# Patient Record
Sex: FEMALE | Race: WHITE | URBAN - METROPOLITAN AREA
[De-identification: names, ages, dates, MRNs, and addresses within clinical notes are randomized per-mention and may not be internally consistent; named-entity substitution may affect disease eponyms.]

---

## 2018-02-04 ENCOUNTER — OFFICE VISIT (OUTPATIENT)
Dept: URGENT CARE | Facility: URGENT CARE | Age: 38
End: 2018-02-04
Payer: COMMERCIAL

## 2018-02-04 VITALS
OXYGEN SATURATION: 100 % | SYSTOLIC BLOOD PRESSURE: 102 MMHG | WEIGHT: 117.2 LBS | TEMPERATURE: 97.3 F | DIASTOLIC BLOOD PRESSURE: 60 MMHG | HEART RATE: 76 BPM

## 2018-02-04 DIAGNOSIS — H57.12 LEFT EYE PAIN: Primary | ICD-10-CM

## 2018-02-04 PROCEDURE — 99213 OFFICE O/P EST LOW 20 MIN: CPT | Performed by: FAMILY MEDICINE

## 2018-02-04 RX ORDER — TOBRAMYCIN AND DEXAMETHASONE 3; 1 MG/ML; MG/ML
1 SUSPENSION/ DROPS OPHTHALMIC
Qty: 1 BOTTLE | Refills: 0 | Status: SHIPPED | OUTPATIENT
Start: 2018-02-04 | End: 2018-02-11

## 2018-02-04 NOTE — NURSING NOTE
Chief Complaint   Patient presents with     Conjunctivitis     Her left eye is red, light sensitive, and has drainage coming out of it.        Initial /60  Pulse 76  Temp 97.3  F (36.3  C) (Tympanic)  Wt 117 lb 3.2 oz (53.2 kg)  SpO2 100% There is no height or weight on file to calculate BMI.  Medication Reconciliation: complete     Lyndsey Solis MA

## 2018-02-04 NOTE — PROGRESS NOTES
Chief Complaint   Patient presents with     Conjunctivitis     Her left eye is red, light sensitive, and has drainage coming out of it.       ADDITIONAL HPI: 37 year old female here for above issue.     ROS: 10 point review of systems negative except as per HPI.    PAST MEDICAL HISTORY:  No past medical history on file.     ACTIVE MEDICAL PROBLEMS:  There is no problem list on file for this patient.       FAMILY HISTORY:  No family history on file.    SOCIAL HISTORY:  Social History     Social History     Marital status: Single     Spouse name: N/A     Number of children: N/A     Years of education: N/A     Occupational History     Not on file.     Social History Main Topics     Smoking status: Not on file     Smokeless tobacco: Not on file     Alcohol use Not on file     Drug use: Not on file     Sexual activity: Not on file     Other Topics Concern     Not on file     Social History Narrative       MEDICATIONS:  No current outpatient prescriptions on file.       ALLERGIES:   No Known Allergies    Problem list, Medication list, Allergies, and Medical/Social/Surgical histories reviewed in [facility] and updated as appropriate.    OBJECTIVE:                                                    VITALS: /60  Pulse 76  Temp 97.3  F (36.3  C) (Tympanic)  Wt 117 lb 3.2 oz (53.2 kg)  SpO2 100% There is no height or weight on file to calculate BMI.  GENERAL: Pleasant, well appearing female.  HEENT: PERRL, EOMI, left  conjunctival injection and wattering. Has mild left  eye pain with contralateral pupillary reflex.     ASSESSMENT/PLAN:                                                    1. Left eye pain  Suspect possible iritis. Advised follow-up with ophthalmology within 24 hrs. Will start tobradex eye drops.   - OPHTHALMOLOGY ADULT REFERRAL  - tobramycin-dexamethasone (TOBRADEX) 0.3-0.1 % ophthalmic susp; Place 1 drop Into the left eye every 4 hours (while awake) for 7 days  Dispense: 1 Bottle; Refill: 0

## 2018-02-04 NOTE — MR AVS SNAPSHOT
After Visit Summary   2/4/2018    Erin Rob    MRN: [id number]           Patient Information     Date Of Birth          1980        Visit Information        Provider Department      2/4/2018 9:10 AM Jose Arango MD [facility]        Today's Diagnoses     Left eye pain    -  1      Care Instructions    Possible iritis follow-up with ophthalmology.          Follow-ups after your visit        Additional Services     OPHTHALMOLOGY ADULT REFERRAL       Your provider has referred you to: [facility]: [facility] [phone]   [email or web address]  [facility]: [facility] [phone]   [email or web address]    Please be aware that coverage of these services is subject to the terms and limitations of your health insurance plan.  Call member services at your health plan with any benefit or coverage questions.      Please bring the following with you to your appointment:    (1) Any X-Rays, CTs or MRIs which have been performed.  Contact the facility where they were done to arrange for  prior to your scheduled appointment.    (2) List of current medications  (3) This referral request   (4) Any documents/labs given to you for this referral                  Who to contact     If you have questions or need follow up information about today's clinic visit or your schedule please contact [facility] directly at [phone].  Normal or non-critical lab and imaging results will be communicated to you by MyChart, letter or phone within 4 business days after the clinic has received the results. If you do not hear from us within 7 days, please contact the clinic through MyChart or phone. If you have a critical or abnormal lab result, we will notify you by phone as soon as possible.  Submit refill requests through [employer] or call your pharmacy and they will forward the refill request to us. Please allow 3  "business days for your refill to be completed.          Additional Information About Your Visit        [employer] Information     [employer] lets you send messages to your doctor, view your test results, renew your prescriptions, schedule appointments and more. To sign up, go to [email or web address] . Click on \"Log in\" on the left side of the screen, which will take you to the Welcome page. Then click on \"Sign up Now\" on the right side of the page.     You will be asked to enter the access code listed below, as well as some personal information. Please follow the directions to create your username and password.     Your access code is: [email or web address]  Expires: 2018 11:43 AM     Your access code will  in 90 days. If you need help or a new code, please call your [place] clinic or [phone].        Care EveryWhere ID     This is your Care EveryWhere ID. This could be used by other organizations to access your [place] medical records  [id number]        Your Vitals Were     Pulse Temperature Pulse Oximetry             76 97.3  F (36.3  C) (Tympanic) 100%          Blood Pressure from Last 3 Encounters:   18 102/60    Weight from Last 3 Encounters:   18 117 lb 3.2 oz (53.2 kg)              We Performed the Following     OPHTHALMOLOGY ADULT REFERRAL          Today's Medication Changes          These changes are accurate as of 18  9:44 AM.  If you have any questions, ask your nurse or doctor.               Start taking these medicines.        Dose/Directions    tobramycin-dexamethasone 0.3-0.1 % ophthalmic susp   Commonly known as:  TOBRADEX   Used for:  Left eye pain        Dose:  1 drop   Place 1 drop Into the left eye every 4 hours (while awake) for 7 days   Quantity:  1 Bottle   Refills:  0            Where to get your medicines      These medications were sent to [facility] [address] [place] -  [facility]  [address] [facility] [address]     Phone:  [phone]     " tobramycin-dexamethasone 0.3-0.1 % ophthalmic susp                Primary Care Provider Office Phone # Fax #    [facility] [phone] [address] [facility] [address]        Equal Access to Services     MARKEL ACOSTA : Rayshawn pena hadverónicao Soomaali, waaxda luqadaha, qaybta kaalmada adeegyada, anand cricketin hayaan charujerome meyer benoit mai. So [facility] [phone].    ATENCIÓN: Si habla español, tiene a garrett disposición servicios gratuitos de asistencia lingüística. Llame al [phone].    We comply with applicable federal civil rights laws and [place] laws. We do not discriminate on the basis of race, color, national origin, age, disability, sex, sexual orientation, or gender identity.            Thank you!     Thank you for choosing [facility]  for your care. Our goal is always to provide you with excellent care. Hearing back from our patients is one way we can continue to improve our services. Please take a few minutes to complete the written survey that you may receive in the mail after your visit with us. Thank you!             Your Updated Medication List - Protect others around you: Learn how to safely use, store and throw away your medicines at www.disposemymeds.org.          This list is accurate as of 2/4/18  9:44 AM.  Always use your most recent med list.                   Brand Name Dispense Instructions for use Diagnosis    tobramycin-dexamethasone 0.3-0.1 % ophthalmic susp    TOBRADEX    1 Bottle    Place 1 drop Into the left eye every 4 hours (while awake) for 7 days    Left eye pain

## 2018-02-13 ENCOUNTER — TELEPHONE (OUTPATIENT)
Dept: URGENT CARE | Facility: URGENT CARE | Age: 38
End: 2018-02-13

## 2018-02-13 NOTE — TELEPHONE ENCOUNTER
Were you satisfied with the wait time to see your provider? yes     Do you feel your provider took the time to listen to your concerns?  yes     Would you recommend our Urgent Care services to others?  yes     Comments:      Appointment scheduled? no     Location? [place]